# Patient Record
Sex: FEMALE | Race: WHITE
[De-identification: names, ages, dates, MRNs, and addresses within clinical notes are randomized per-mention and may not be internally consistent; named-entity substitution may affect disease eponyms.]

---

## 2023-08-22 ENCOUNTER — HOSPITAL ENCOUNTER (EMERGENCY)
Dept: HOSPITAL 97 - ER | Age: 78
Discharge: HOME | End: 2023-08-22
Payer: COMMERCIAL

## 2023-08-22 VITALS — TEMPERATURE: 97.4 F

## 2023-08-22 VITALS — SYSTOLIC BLOOD PRESSURE: 126 MMHG | DIASTOLIC BLOOD PRESSURE: 73 MMHG

## 2023-08-22 VITALS — OXYGEN SATURATION: 97 %

## 2023-08-22 DIAGNOSIS — E03.9: ICD-10-CM

## 2023-08-22 DIAGNOSIS — E78.00: ICD-10-CM

## 2023-08-22 DIAGNOSIS — N28.1: ICD-10-CM

## 2023-08-22 DIAGNOSIS — Z79.82: ICD-10-CM

## 2023-08-22 DIAGNOSIS — S30.0XXA: Primary | ICD-10-CM

## 2023-08-22 DIAGNOSIS — Z88.5: ICD-10-CM

## 2023-08-22 DIAGNOSIS — I10: ICD-10-CM

## 2023-08-22 LAB
ALBUMIN SERPL BCP-MCNC: 3.4 G/DL (ref 3.4–5)
ALP SERPL-CCNC: 101 U/L (ref 45–117)
ALT SERPL W P-5'-P-CCNC: 29 U/L (ref 13–56)
AST SERPL W P-5'-P-CCNC: 17 U/L (ref 15–37)
BUN BLD-MCNC: 20 MG/DL (ref 7–18)
GLUCOSE SERPLBLD-MCNC: 145 MG/DL (ref 74–106)
HCT VFR BLD CALC: 43.9 % (ref 36–45)
LIPASE SERPL-CCNC: 38 U/L (ref 13–75)
LYMPHOCYTES # SPEC AUTO: 3.5 K/UL (ref 0.7–4.9)
MCV RBC: 88.9 FL (ref 80–100)
PMV BLD: 8.1 FL (ref 7.6–11.3)
POTASSIUM SERPL-SCNC: 3.8 MEQ/L (ref 3.5–5.1)
RBC # BLD: 4.93 M/UL (ref 3.86–4.86)
WBC # BLD AUTO: 10.9 THOU/UL (ref 4.3–10.9)

## 2023-08-22 PROCEDURE — 74177 CT ABD & PELVIS W/CONTRAST: CPT

## 2023-08-22 PROCEDURE — 85025 COMPLETE CBC W/AUTO DIFF WBC: CPT

## 2023-08-22 PROCEDURE — 96374 THER/PROPH/DIAG INJ IV PUSH: CPT

## 2023-08-22 PROCEDURE — 99284 EMERGENCY DEPT VISIT MOD MDM: CPT

## 2023-08-22 PROCEDURE — 36415 COLL VENOUS BLD VENIPUNCTURE: CPT

## 2023-08-22 PROCEDURE — 83690 ASSAY OF LIPASE: CPT

## 2023-08-22 PROCEDURE — 96361 HYDRATE IV INFUSION ADD-ON: CPT

## 2023-08-22 PROCEDURE — 80053 COMPREHEN METABOLIC PANEL: CPT

## 2023-08-22 PROCEDURE — 96375 TX/PRO/DX INJ NEW DRUG ADDON: CPT

## 2023-08-22 PROCEDURE — 81001 URINALYSIS AUTO W/SCOPE: CPT

## 2023-08-22 NOTE — EDPHYS
Physician Documentation                                                                           

 [facility]                                                                 

Name: Kristine Valencia                                                                             

Age: 77 yrs                                                                                       

Sex: Female                                                                                       

: 1945                                                                                   

MRN: [id number]                                                                                   

Arrival Date: 2023                                                                          

Time: 11:34                                                                                       

Account#: [id number]                                                                            

Bed 19                                                                                            

Private MD:                                                                                       

[facility] Physician [place]                                                                      

HPI:                                                                                              

                                                                                             

12:35 This 77 yrs old Female presents to ER via Wheelchair with complaints of Flank Pain.     sb4 

12:35 The patient complains of pain in the right low back. The pain radiates to the right     sb4 

      lower quadrant. Onset: The symptoms/episode began/occurred 3 day(s) ago. Modifying          

      factors: The symptoms are alleviated by nothing. the symptoms are aggravated by             

      movement, palpation/percussion. Associated signs and symptoms: Pertinent positives:         

      nausea, Pertinent negatives: diarrhea, dizziness, dysuria, fever, urinary frequency,        

      headache, hematuria, pain radiating to the lower extremities, vomiting. The patient has     

      not experienced similar symptoms in the past. The patient has not recently seen a           

      physician.                                                                                  

                                                                                                  

Historical:                                                                                       

- Allergies:                                                                                      

11:49 Valium (agitation);                                                                     aa5 

- Home Meds:                                                                                      

11:49 levothyroxine 75mg once a day, Losartan 50mg daily, metoprolol 25mg daily, omeprazole   aa5 

      40mg daily, farxiga 10mg daily, atorvastatin 10mg daily, cyclobenzaprine 10mg daily,        

      aspirin 81mg daily [Active];                                                                

- PMHx:                                                                                           

11:49 Hypertensive disorder; Hypothyroidism; Hypercholesterolemia;                            aa5 

- PSHx:                                                                                           

11:49 Cholecystectomy;                                                                        aa5 

                                                                                                  

- Immunization history:: Adult Immunizations unknown.                                             

- Social history:: Smoking status: Patient denies any tobacco usage or history of.                

                                                                                                  

                                                                                                  

ROS:                                                                                              

12:50 Constitutional: Negative for fever, chills, and weight loss.                            sb4 

12:50 Abdomen/GI: Positive for nausea.                                                            

12:50 Abdomen/GI: Positive for abdominal pain.                                                    

12:50 Back: Positive for flank pain, on the right.                                                

12:50 All other systems are negative.                                                             

                                                                                                  

Exam:                                                                                             

12:50 Constitutional:  This is a well developed, well nourished patient who is awake, alert,  sb4 

      and in no acute distress. Head/Face:  Normocephalic, atraumatic. Eyes:  Extra-ocular        

      motions intact.  Periorbital areas with no swelling, redness, or edema. ENT:  Mucous        

      membranes moist. Cardiovascular:  Regular rate and rhythm with a normal S1 and S2.          

      Respiratory:  Lungs have equal breath sounds bilaterally, clear to auscultation and         

      percussion.  No rales, rhonchi or wheezes noted.  No increased work of breathing, no        

      retractions or nasal flaring. Skin:  Warm, dry with normal turgor.  Normal color with       

      no rashes, no lesions, and no evidence of cellulitis. MS/ Extremity:  Pulses equal, no      

      cyanosis.  Neurovascular intact.  Full, normal range of motion. Neuro:  Awake and           

      alert, GCS 15, oriented to person, place, time, and situation.  Cranial nerves II-XII       

      grossly intact.  Motor strength 5/5 in all extremities.  Sensory grossly intact.            

      Cerebellar exam normal.  Normal gait.                                                       

12:50 Abdomen/GI: Palpation: moderate abdominal tenderness, in the right lower quadrant,          

      involuntary guarding, is elicited in the right lower quadrant.                              

12:50 Back: CVA tenderness, that is moderate, is noted on the right.                              

                                                                                                  

Vital Signs:                                                                                      

11:44  / 82; Pulse 87; Resp 18 S; Temp 97.4(TE); Pulse Ox 93% on R/A; Weight 106.59 kg  aa5 

      (R); Height 5 ft. 6 in. (R);                                                                

12:10  / 85; Pulse 77; Resp 18; Pulse Ox 92% on R/A; Pain 8/10;                         nj1 

13:00  / 83; Pulse 83; Resp 18; Pulse Ox 93% on R/A; Pain 4/10;                         nj1 

14:05  / 73; Pulse 79; Resp 17; Pulse Ox 97% ; Pain 7/10;                               nj1 

15:01  / 73; Pulse 75; Resp 18; Pulse Ox 97% ;                                          nj1 

11:44 Body Mass Index 37.93 (106.59 kg, 167.64 cm)                                            aa5 

12:10 Pain Scale: Adult                                                                       nj1 

13:00 Pain Scale: Adult                                                                       nj1 

14:05 Pain Scale: Adult                                                                       nj1 

                                                                                                  

MDM:                                                                                              

11:37 Patient medically screened.                                                             sb4 

12:50 Differential diagnosis: nephrolithiasis, pyelonephritis, UTI, appendicitis, back strain.sb4 

14:31 Data reviewed: vital signs, nurses notes, lab test result(s), radiologic studies, and   sb4 

      as a result, I will discharge patient. Independent interpretation of the following          

      test(s) in the Emergency Department CT Scan: My interpretation is my interpretation of      

      the CT abdomen pelvis images are no nephrolithiasis or ureterolithiasis. Historians         

      other than the Patient: Daughter/Son: daughter. Care significantly affected by the          

      following chronic conditions: Hypertension, Chronic Kidney Disease. Counseling: I had a     

      detailed discussion with the patient and/or guardian regarding the historical points,       

      exam findings, and any diagnostic results supporting the discharge/admit diagnosis, lab     

      results, radiology results, to return to the emergency department if symptoms worsen or     

      persist or if there are any questions or concerns that arise at home. Special               

      discussion: Based on the patient's Hx, exam, and Dx evaluation, there is no indication      

      for emergent surgery or inpatient Tx. It is understood by the patient/guardian that if      

      the Sx's persist or worsen they need to return immediately for re-evaluation.               

                                                                                                  

                                                                                             

11:51 Order name: CBC with Diff; Complete Time: 12:26                                         sb4 

                                                                                             

11:51 Order name: CMP; Complete Time: 12:42                                                   sb4 

                                                                                             

11:51 Order name: Lipase; Complete Time: 12:42                                                sb4 

                                                                                             

11:51 Order name: Urinalysis w/ reflexes; Complete Time: 12:21                                sb4 

                                                                                             

11:51 Order name: CT Abd/Pelvis - IV Contrast Only; Complete Time: 13:32                      sb4 

                                                                                             

11:51 Order name: IV Saline Lock; Complete Time: 12:27                                        sb4 

                                                                                             

11:51 Order name: Labs collected and sent; Complete Time: 12:27                               sb4 

                                                                                                  

Administered Medications:                                                                         

12:15 Drug: Ondansetron IVP 4 mg Route: IVP; Site: right antecubital;                         nj1 

13:00 Follow up: Response: No adverse reaction                                                nj1 

12:15 Drug: morphine IVP or IV 4 mg Route: IVP; Infused Over: 4 mins; Site: right antecubital;nj1 

13:00 Follow up: Response: No adverse reaction; Pain is decreased                             nj1 

13:52 Drug: NS 0.9% IV 1000 ml Route: IV; Rate: 1 bolus; Site: right antecubital;             nj1 

15:10 Follow up: Response: No adverse reaction; IV Status: Completed infusion; IV Intake:     nj1 

      1000ml                                                                                      

14:05 Drug: Ketorolac IVP 15 mg Route: IVP; Site: right antecubital;                          nj1 

15:10 Follow up: Response: No adverse reaction                                                nj1 

                                                                                                  

                                                                                                  

Disposition Summary:                                                                              

23 14:45                                                                                    

Discharge Ordered                                                                                 

      Location: Home                                                                          sb4 

      Problem: new                                                                            sb4 

      Symptoms: have improved                                                                 sb4 

      Condition: Stable                                                                       sb4 

      Diagnosis                                                                                   

        - Contusion of lower back and pelvis                                                  sb4 

        - Cyst of kidney, acquired                                                            sb4 

      Followup:                                                                               sb4 

        - With:                                                                                    

        - When: As needed                                                                          

        - Reason: Recheck today's complaints, Continuance of care, Re-evaluation by your           

      physician                                                                                   

      Discharge Instructions:                                                                     

        - Discharge Summary Sheet                                                             sb4 

        - Contusion, Easy-to-Read                                                             sb4 

        - Low Back Sprain or Strain Rehab                                                     sb4 

      Forms:                                                                                      

        - Medication Reconciliation Form                                                      sb4 

        - Thank You Letter                                                                    sb4 

        - Antibiotic Education                                                                sb4 

        - Prescription Opioid Use                                                             sb4 

        - Patient Portal Instructions                                                         sb4 

        - Leadership Thank You Letter                                                         sb4 

      Prescriptions:                                                                              

        - acetaminophen-codeine 300-15 mg Oral tablet                                              

            - take 1 tablet by ORAL route 4 times per day As needed; 12 tablet; Refills: 0,   sb4 

      Product Selection Permitted                                                                 

Signatures:                                                                                       

Dispatcher [employer]                           EDMS                                                 

Cindy Muse, RN                     RN   aa5                                                  

Katja Hammond, PA-C                     PA-C sb4                                                  

Fadia Hills, RN                         RN   nj1                                                  

                                                                                                  

Corrections: (The following items were deleted from the chart)                                    

11:51 11:46 Allergies: "a tranquilizer the dentist gave me"- agitation; aa5                   aa5 

12:37 12:35 Onset: The symptoms/episode began/occurred 2 day(s) ago, sb4                      sb4 

12:37 12:35 Associated signs and symptoms: Pertinent positives: nausea, Pertinent negatives:  sb4 

      diarrhea, dizziness, dysuria, fever, urinary frequency, headache, hematuria, pain           

      radiating to the lower extremities, vomiting, sb4                                           

                                                                                                  

**************************************************************************************************

## 2023-08-22 NOTE — RAD REPORT
EXAM DESCRIPTION:  CTAbdomen   Pelvis W Contrast - 8/22/2023 12:55 pm

 

CLINICAL HISTORY:  Abdominal pain.

Abd pain;Flank pain

 

COMPARISON:  No comparisons

 

TECHNIQUE:  Biphasic CT imaging of the abdomen and pelvis was performed with 100 ml non-ionic IV cont
rast.

 

All CT scans are performed using dose optimization technique as appropriate and may include automated
 exposure control or mA/KV adjustment according to patient size.

 

FINDINGS:  The lung bases are clear.Cholecystectomy clips.

 

The liver, spleen, pancreas, adrenal glands and kidneys are within normal limits. Bilateral renal cys
ts, largest on the left measuring 4.5 cm.

 

No bowel obstruction, free air, free fluid or abscess. Moderate stool is present throughout the colon
. Sigmoid diverticulosis coli without diverticulitis. Normal appendix.  No evidence of significant ly
mphadenopathy.

 

Moderate lumbar degenerative changes.

 

IMPRESSION:  No acute intra-abdominal or pelvic finding.

## 2023-08-22 NOTE — ER
Nurse's Notes                                                                                     

 [facility]                                                                 

Name: Kristine Valencia                                                                             

Age: 77 yrs                                                                                       

Sex: Female                                                                                       

: 1945                                                                                   

MRN: [id number]                                                                                   

Arrival Date: 2023                                                                          

Time: 11:34                                                                                       

Account#: [id number]                                                                            

Bed 19                                                                                            

Private MD:                                                                                       

Diagnosis: Contusion of lower back and pelvis;Cyst of kidney, acquired                            

                                                                                                  

Presentation:                                                                                     

                                                                                             

11:44 Chief complaint: Patient states: Right flank pain radiating to right lower back that    aa5 

      began Saturday. Pt also reports nausea. Pt denies urinary symptoms. Coronavirus screen:     

      nausea. Ebola Screen: Patient denies travel to an Ebola-affected area in the 21 days        

      before illness onset. Onset of symptoms was 2023.                                    

11:44 Acuity: YAZMIN 3                                                                           aa5 

11:44 Method Of Arrival: Wheelchair                                                           aa5 

11:44 Initial Sepsis Screen: Does the patient meet any 2 criteria? No. Patient's initial      aa5 

      sepsis screen is negative. Does the patient have a suspected source of infection? No.       

      Patient's initial sepsis screen is negative. Risk Assessment: Do you want to hurt           

      yourself or someone else? Patient reports no desire to harm self or others.                 

                                                                                                  

Historical:                                                                                       

- Allergies:                                                                                      

11:49 Valium (agitation);                                                                     aa5 

- Home Meds:                                                                                      

11:49 levothyroxine 75mg once a day, Losartan 50mg daily, metoprolol 25mg daily, omeprazole   aa5 

      40mg daily, farxiga 10mg daily, atorvastatin 10mg daily, cyclobenzaprine 10mg daily,        

      aspirin 81mg daily [Active];                                                                

- PMHx:                                                                                           

11:49 Hypertensive disorder; Hypothyroidism; Hypercholesterolemia;                            aa5 

- PSHx:                                                                                           

11:49 Cholecystectomy;                                                                        aa5 

                                                                                                  

- Immunization history:: Adult Immunizations unknown.                                             

- Social history:: Smoking status: Patient denies any tobacco usage or history of.                

                                                                                                  

                                                                                                  

Screenin:25 [facility] ED Fall Risk Assessment (Adult) Score/Fall Risk Level 0 - 2 = Low Risk         nj1 

      Oriented to surroundings, Maintained a safe environment, Hourly rounding (assess needs      

      \T\ fall precautionary measures) done. Abuse screen: Denies threats or abuse. Denies        

      injuries from another. Nutritional screening: No deficits noted. Tuberculosis               

      screening: No symptoms or risk factors identified.                                          

                                                                                                  

Assessment:                                                                                       

12:10 General: Appears in no apparent distress. uncomfortable, Behavior is calm, cooperative, nj1 

      appropriate for age. Pain: Complains of pain in Flank, right Pain currently is 8 out of     

      10 on a pain scale.                                                                         

12:10 Neuro: Level of Consciousness is awake, alert, obeys commands, Oriented to person,      nj1 

      place, time, situation. Cardiovascular: Patient's skin is warm and dry. Respiratory:        

      Airway is patent Respiratory effort is even, unlabored.                                     

13:00 Reassessment: Patient appears in no apparent distress at this time. Patient and/or      nj1 

      family updated on plan of care and expected duration. Pain level reassessed. Patient is     

      alert, oriented x 3, equal unlabored respirations, skin warm/dry/pink.                      

14:05 Reassessment: Patient appears in no apparent distress at this time. Patient and/or      nj1 

      family updated on plan of care and expected duration. Pain level reassessed. Patient is     

      alert, oriented x 3, equal unlabored respirations, skin warm/dry/pink.                      

                                                                                                  

Vital Signs:                                                                                      

11:44  / 82; Pulse 87; Resp 18 S; Temp 97.4(TE); Pulse Ox 93% on R/A; Weight 106.59 kg  aa5 

      (R); Height 5 ft. 6 in. (R);                                                                

12:10  / 85; Pulse 77; Resp 18; Pulse Ox 92% on R/A; Pain 8/10;                         nj1 

13:00  / 83; Pulse 83; Resp 18; Pulse Ox 93% on R/A; Pain 4/10;                         nj1 

14:05  / 73; Pulse 79; Resp 17; Pulse Ox 97% ; Pain 7/10;                               nj1 

15:01  / 73; Pulse 75; Resp 18; Pulse Ox 97% ;                                          nj1 

11:44 Body Mass Index 37.93 (106.59 kg, 167.64 cm)                                            aa5 

12:10 Pain Scale: Adult                                                                       nj1 

13:00 Pain Scale: Adult                                                                       nj1 

14:05 Pain Scale: Adult                                                                       nj1 

                                                                                                  

ED Course:                                                                                        

11:36 Patient arrived in ED.                                                                  rg4 

11:36 Katja Hammond PA-C is PHCP.                                                            sb4 

11:37 Warner Rock MD is Attending Physician.                                             sb4 

11:44 Arm band placed on.                                                                     aa5 

11:46 Triage completed.                                                                       aa5 

11:53 Fadia Hills, RN is Primary Nurse.                                                       nj1 

12:15 Inserted saline lock: 20 gauge in right antecubital area, using aseptic technique.      nj1 

      Blood collected.                                                                            

12:26 Patient has correct armband on for positive identification. Bed in low position. Call   nj1 

      light in reach. Side rails up X 1. Adult w/ patient. Provided Education on: call light.     

12:57 CT Abd/Pelvis - IV Contrast Only In Process Unspecified.                                EDMS

14:45 Luis Riggs DO is Referral Physician.                                               sb4 

15:06 No provider procedures requiring assistance completed. IV discontinued, intact,         nj1 

      bleeding controlled.                                                                        

                                                                                                  

Administered Medications:                                                                         

12:15 Drug: Ondansetron IVP 4 mg Route: IVP; Site: right antecubital;                         nj1 

13:00 Follow up: Response: No adverse reaction                                                nj1 

12:15 Drug: morphine IVP or IV 4 mg Route: IVP; Infused Over: 4 mins; Site: right antecubital;nj1 

13:00 Follow up: Response: No adverse reaction; Pain is decreased                             nj1 

13:52 Drug: NS 0.9% IV 1000 ml Route: IV; Rate: 1 bolus; Site: right antecubital;             nj1 

15:10 Follow up: Response: No adverse reaction; IV Status: Completed infusion; IV Intake:     nj1 

      1000ml                                                                                      

14:05 Drug: Ketorolac IVP 15 mg Route: IVP; Site: right antecubital;                          nj1 

15:10 Follow up: Response: No adverse reaction                                                nj1 

                                                                                                  

                                                                                                  

Medication:                                                                                       

15:10 VIS not applicable for this client.                                                     nj1 

                                                                                                  

Intake:                                                                                           

15:10 IV: 1000ml; Total: 1000ml.                                                              nj1 

                                                                                                  

Outcome:                                                                                          

14:45 Discharge ordered by MD.                                                                sb4 

15:06 Discharged to home via wheelchair, with family.                                         nj1 

15:06 Condition: stable                                                                           

15:06 Discharge instructions given to patient, family, Instructed on discharge instructions,      

      follow up and referral plans. medication usage, Demonstrated understanding of               

      instructions, follow-up care, medications, Prescriptions given X 1.                         

15:11 Patient left the ED.                                                                    nj1 

                                                                                                  

Signatures:                                                                                       

Dispatcher [employer]                           EDMS                                                 

Cindy Muse, RN                     RN   Winifred [email or web address]                                                  

Katja Hammond, PA-C                     PARodrickC [email or web address]                                                  

Fadia Hills RN                         RN   nj1                                                  

                                                                                                  

Corrections: (The following items were deleted from the chart)                                    

11:51 11:44 Pulse 87bpm; Resp 18bpm; Spontaneous; Pulse Ox 93% RA; Temp 97.4F Temporal; aa5   aa5 

11:51 11:46 Allergies: "a tranquilizer the dentist gave me"- agitation; aa5                   aa5 

11:52 11:44 Pulse 87bpm; Resp 18bpm; Spontaneous; Pulse Ox 93% RA; Temp 97.4F Temporal;       aa5 

      106.59 kg Reported; Height 5 ft. 6 in. Reported; BMI: 37.9; aa5                             

                                                                                                  

**************************************************************************************************

## 2023-08-28 ENCOUNTER — HOSPITAL ENCOUNTER (EMERGENCY)
Dept: HOSPITAL 97 - ER | Age: 78
Discharge: HOME | End: 2023-08-28
Payer: COMMERCIAL

## 2023-08-28 VITALS — OXYGEN SATURATION: 96 % | SYSTOLIC BLOOD PRESSURE: 124 MMHG | DIASTOLIC BLOOD PRESSURE: 66 MMHG

## 2023-08-28 DIAGNOSIS — E78.00: ICD-10-CM

## 2023-08-28 DIAGNOSIS — Z88.5: ICD-10-CM

## 2023-08-28 DIAGNOSIS — I10: ICD-10-CM

## 2023-08-28 DIAGNOSIS — R10.31: Primary | ICD-10-CM

## 2023-08-28 DIAGNOSIS — E03.9: ICD-10-CM

## 2023-08-28 LAB
ALBUMIN SERPL BCP-MCNC: 3.7 G/DL (ref 3.4–5)
ALP SERPL-CCNC: 105 U/L (ref 45–117)
ALT SERPL W P-5'-P-CCNC: 30 U/L (ref 13–56)
AST SERPL W P-5'-P-CCNC: 19 U/L (ref 15–37)
BUN BLD-MCNC: 14 MG/DL (ref 7–18)
GLUCOSE SERPLBLD-MCNC: 116 MG/DL (ref 74–106)
HCT VFR BLD CALC: 45.2 % (ref 36–45)
LIPASE SERPL-CCNC: 45 U/L (ref 13–75)
LYMPHOCYTES # SPEC AUTO: 5.1 K/UL (ref 0.7–4.9)
MCV RBC: 89.9 FL (ref 80–100)
PMV BLD: 8.2 FL (ref 7.6–11.3)
POTASSIUM SERPL-SCNC: 3.4 MEQ/L (ref 3.5–5.1)
RBC # BLD: 5.03 M/UL (ref 3.86–4.86)
WBC # BLD AUTO: 13.8 THOU/UL (ref 4.3–10.9)

## 2023-08-28 PROCEDURE — 96375 TX/PRO/DX INJ NEW DRUG ADDON: CPT

## 2023-08-28 PROCEDURE — 76377 3D RENDER W/INTRP POSTPROCES: CPT

## 2023-08-28 PROCEDURE — 87088 URINE BACTERIA CULTURE: CPT

## 2023-08-28 PROCEDURE — 87086 URINE CULTURE/COLONY COUNT: CPT

## 2023-08-28 PROCEDURE — 99284 EMERGENCY DEPT VISIT MOD MDM: CPT

## 2023-08-28 PROCEDURE — 74176 CT ABD & PELVIS W/O CONTRAST: CPT

## 2023-08-28 PROCEDURE — 80053 COMPREHEN METABOLIC PANEL: CPT

## 2023-08-28 PROCEDURE — 96374 THER/PROPH/DIAG INJ IV PUSH: CPT

## 2023-08-28 PROCEDURE — 81001 URINALYSIS AUTO W/SCOPE: CPT

## 2023-08-28 PROCEDURE — 83690 ASSAY OF LIPASE: CPT

## 2023-08-28 PROCEDURE — 36415 COLL VENOUS BLD VENIPUNCTURE: CPT

## 2023-08-28 PROCEDURE — 85025 COMPLETE CBC W/AUTO DIFF WBC: CPT

## 2023-08-28 NOTE — ER
Nurse's Notes                                                                                     

 [facility]                                                                 

Name: Kristine Valencia                                                                             

Age: 77 yrs                                                                                       

Sex: Female                                                                                       

: 1945                                                                                   

MRN: [id number]                                                                                   

Arrival Date: 2023                                                                          

Time: 15:21                                                                                       

Account#: [id number]                                                                            

Bed 9                                                                                             

Private MD:                                                                                       

Diagnosis: Lower abdominal pain, unspecified                                                      

                                                                                                  

Presentation:                                                                                     

                                                                                             

16:17 Chief complaint: Patient states: right sided abd pain X 3 weeks , was seen here         iw  

      recently had a ct done. Coronavirus screen: At this time, the client does not indicate      

      any symptoms associated with coronavirus-19. Ebola Screen: Patient negative for fever       

      greater than or equal to 101.5 degrees Fahrenheit, and additional compatible Ebola          

      Virus Disease symptoms Patient denies exposure to infectious person. Patient denies         

      travel to an Ebola-affected area in the 21 days before illness onset. No symptoms or        

      risks identified at this time. Onset of symptoms was 2023.                       

16:17 Method Of Arrival: Wheelchair                                                           iw  

16:17 Acuity: YAZMIN 3                                                                           iw  

16:18 Initial Sepsis Screen: Does the patient meet any 2 criteria? No. Patient's initial      iw  

      sepsis screen is negative. Does the patient have a suspected source of infection? No.       

      Patient's initial sepsis screen is negative. Risk Assessment: Do you want to hurt           

      yourself or someone else? Patient reports no desire to harm self or others.                 

                                                                                                  

Historical:                                                                                       

- Allergies:                                                                                      

16:17 Valium (agitation);                                                                     iw  

- Home Meds:                                                                                      

20:46 levothyroxine 75mg once a day, Losartan 50mg daily, metoprolol 25mg daily, omeprazole   me1 

      40mg daily, farxiga 10mg daily, atorvastatin 10mg daily, cyclobenzaprine 10mg daily,        

      aspirin 81mg daily [Active];                                                                

- PMHx:                                                                                           

16:17 Hypercholesterolemia; Hypertensive disorder; Hypothyroidism;                            iw  

- PSHx:                                                                                           

16:17 Cholecystectomy;                                                                        iw  

                                                                                                  

- Immunization history:: Adult Immunizations up to date.                                          

- Social history:: Smoking status: Patient/guardian denies using tobacco, but has a               

  distant history of tobacco abuse.                                                               

                                                                                                  

                                                                                                  

Screenin:57 [facility] ED Fall Risk Assessment (Adult) History of falling in the last 3 months,       me1 

      including since admission No falls in past 3 months (0 pts) Confusion or Disorientation     

      No (0 pts) Intoxicated or Sedated No (0 pts) Impaired Gait No (0 pts) Mobility Assist       

      Device Used No (0 pt) Altered Elimination No (0 pt) Score/Fall Risk Level 0 - 2 = Low       

      Risk. Abuse screen: Denies threats or abuse. Nutritional screening: No deficits noted.      

      Tuberculosis screening: No symptoms or risk factors identified.                             

                                                                                                  

Assessment:                                                                                       

19:57 General: Appears uncomfortable, well groomed, well developed, well nourished, Behavior  me1 

      is calm, cooperative, appropriate for age, Reports RLQ abdominal pain that radiates to      

      right flank x 3 weeks. Denies fever, feeling ill, fatigue, chills. Pain: Complains of       

      pain in abdomen Pain radiates to back Pain currently is 8 out of 10 on a pain scale.        

      Quality of pain is described as shooting, Pain began 3 weeks ago Is continuous. Neuro:      

      Level of Consciousness is awake, alert, obeys commands, Oriented to person, place,          

      time, situation, Appropriate for age. Cardiovascular: Capillary refill < 3 seconds          

      Patient's skin is warm and dry. Respiratory: Airway is patent Respiratory effort is         

      even, unlabored, Respiratory pattern is regular, symmetrical. GI: Bowel sounds present      

      X 4 quads. Reports lower abdominal pain. : Denies burning with urination.                 

                                                                                                  

Vital Signs:                                                                                      

19:57  / 68; Pulse 86; Resp 18; Pulse Ox 94% on R/A;                                    me1 

20:44  / 66; Pulse 93; Resp 17; Pulse Ox 96% on R/A;                                    me1 

                                                                                                  

ED Course:                                                                                        

15:21 Patient placed in waiting room.                                                         me1 

15:24 Patient arrived in ED.                                                                  mg5 

15:59 [facility]                                                        kb  

15:59 Ap Rush MD is Attending Physician.                                                kb  

16:17 Triage completed.                                                                       iw  

17:19 CBC with Diff Sent.                                                                     bc6 

17:19 CMP Sent.                                                                               bc6 

17:20 Lipase Sent.                                                                            bc6 

17:20 Urinalysis w/ reflexes Sent.                                                            bc6 

17:20 Inserted saline lock: 22 gauge in right antecubital area, using aseptic technique.      bc6 

      Blood collected.                                                                            

19:20 Meagan Tejada, RN is Primary Nurse.                                                me1 

19:28 CT Stone Protocol In Process Unspecified.                                               EDMS

19:57 No provider procedures requiring assistance completed. Flushed right antecubital.       me1 

19:57 Patient has correct armband on for positive identification. Bed in low position. Call   me1 

      light in reach. Side rails up X2. Provided Education on: POC. Verbalized understanding.     

      .                                                                                           

20:44 IV discontinued, intact, bleeding controlled, No redness/swelling at site. Pressure     me1 

      dressing applied.                                                                           

                                                                                                  

Administered Medications:                                                                         

19:56 Drug: Ondansetron IVP 4 mg Route: IVP; Site: right antecubital;                         me1 

20:30 Follow up: Response: No adverse reaction; Nausea is decreased                           me1 

19:56 Drug: morphine IVP or IV 4 mg Route: IVP; Infused Over: 4 mins; Site: right antecubital;me1 

20:30 Follow up: Response: No adverse reaction; Pain is decreased                             me1 

                                                                                                  

                                                                                                  

Medication:                                                                                       

19:57 VIS not applicable for this client.                                                     me1 

                                                                                                  

Outcome:                                                                                          

20:23 Discharge ordered by MD.                                                                lópez  

20:45 Discharged to home ambulatory.                                                          me1 

20:45 Condition: stable                                                                           

20:45 Discharge instructions given to patient, family, Instructed on discharge instructions,      

      follow up and referral plans. Demonstrated understanding of instructions, follow-up         

      care.                                                                                       

20:46 Patient left the ED.                                                                    me1 

                                                                                                  

Signatures:                                                                                       

Dispatcher [employer]                           EDTalia Saldana, FNP-C                 FNP-Ckb                                                   

Ana Vargas, RN                     RN   iw                                                   

Lexii Shine                           bc6                                                  

Meagan Tejada, RN                  RN   me1                                                  

Arleth Wisdom                             mg5                                                  

                                                                                                  

Corrections: (The following items were deleted from the chart)                                    

16:19 16:17 Chief complaint: Patient states: right sided abd pain iw                          iw  

                                                                                                  

**************************************************************************************************

## 2023-08-28 NOTE — EDPHYS
Physician Documentation                                                                           

 [facility]                                                                 

Name: Kristine Valencia                                                                             

Age: 77 yrs                                                                                       

Sex: Female                                                                                       

: 1945                                                                                   

MRN: [id number]                                                                                   

Arrival Date: 2023                                                                          

Time: 15:21                                                                                       

Account#: [id number]                                                                            

Bed 9                                                                                             

Private MD:                                                                                       

ED Physician Ap Rush                                                                         

HPI:                                                                                              

                                                                                             

18:06 This 77 yrs old Female presents to ER via Wheelchair with complaints of Abdominal Pain  kb  

      - Low Right Side.                                                                           

18:06 The patient presents with abdominal pain right lower quadrant. Onset: The               kb  

      symptoms/episode began/occurred 3 week(s) ago. The symptoms radiate to right back.          

      Associated signs and symptoms: none. The symptoms are described as constant. Modifying      

      factors: The symptoms are alleviated by nothing, the symptoms are aggravated by             

      nothing. Severity of pain: At its worst the pain was moderate in the emergency              

      department the pain is unchanged. The patient has not experienced similar symptoms in       

      the past. The patient has been recently seen at the [facility]       

      Emergency Department, last week, for similar complaints labs were performed, CT scan        

      was performed.                                                                              

                                                                                                  

Historical:                                                                                       

- Allergies:                                                                                      

16:17 Valium (agitation);                                                                     iw  

- Home Meds:                                                                                      

20:46 levothyroxine 75mg once a day, Losartan 50mg daily, metoprolol 25mg daily, omeprazole   me1 

      40mg daily, farxiga 10mg daily, atorvastatin 10mg daily, cyclobenzaprine 10mg daily,        

      aspirin 81mg daily [Active];                                                                

- PMHx:                                                                                           

16:17 Hypercholesterolemia; Hypertensive disorder; Hypothyroidism;                            iw  

- PSHx:                                                                                           

16:17 Cholecystectomy;                                                                        iw  

                                                                                                  

- Immunization history:: Adult Immunizations up to date.                                          

- Social history:: Smoking status: Patient/guardian denies using tobacco, but has a               

  distant history of tobacco abuse.                                                               

                                                                                                  

                                                                                                  

ROS:                                                                                              

18:05 Constitutional: Negative for fever, chills, and weight loss.                            kb  

18:05 Abdomen/GI: Positive for abdominal pain, Negative for nausea, vomiting, and diarrhea.       

18:05 All other systems are negative.                                                             

                                                                                                  

Exam:                                                                                             

18:05 Constitutional:  This is a well developed, well nourished patient who is awake, alert,  kb  

      and in no acute distress. Head/Face:  Normocephalic, atraumatic. ENT:  Moist Mucous         

      membranes Cardiovascular:  Regular rate and rhythm with a normal S1 and S2.  No             

      gallops, murmurs, or rubs.  No pulse deficits. Respiratory:  Respirations even and          

      unlabored. No increased work of breathing. Talking in full sentences Skin:  Warm, dry       

      with normal turgor.  Normal color. MS/ Extremity:  Pulses equal, no cyanosis.               

      Neurovascular intact.  Full, normal range of motion. Neuro:  Awake and alert, GCS 15,       

      oriented to person, place, time, and situation. Moves all extremities. Normal gait.         

18:05 Abdomen/GI: Inspection: abdomen appears normal, Bowel sounds: normal, Palpation: soft,      

      in all quadrants, mild abdominal tenderness, in the right lower quadrant.                   

                                                                                                  

Vital Signs:                                                                                      

19:57  / 68; Pulse 86; Resp 18; Pulse Ox 94% on R/A;                                    me1 

20:44  / 66; Pulse 93; Resp 17; Pulse Ox 96% on R/A;                                    me1 

                                                                                                  

MDM:                                                                                              

16:01 Patient medically screened.                                                             kb  

18:05 Data reviewed: vital signs, nurses notes.                                               kb  

18:07 Differential diagnosis: appendicitis, non-specific abd pain, Ureterolithiasis, urinary  kb  

      tract infection. Historians other than the Patient: Daughter/Son: daughter.                 

20:23 Counseling: I had a detailed discussion with the patient and/or guardian regarding the  kb  

      historical points, exam findings, and any diagnostic results supporting the                 

      discharge/admit diagnosis, lab results, radiology results, the need for outpatient          

      follow up, a family practitioner, to return to the emergency department if symptoms         

      worsen or persist or if there are any questions or concerns that arise at home.             

                                                                                                  

                                                                                             

16:24 Order name: CBC with Diff; Complete Time: 18:03                                           

                                                                                             

16:24 Order name: CMP; Complete Time: 18:03                                                     

                                                                                             

16:24 Order name: Lipase; Complete Time: 18:03                                                iw  

                                                                                             

16:24 Order name: Urinalysis w/ reflexes; Complete Time: 17:50                                iw  

                                                                                             

17:53 Order name: Urine Culture                                                               EDMS

                                                                                             

18:03 Order name: CT Stone Protocol; Complete Time: 19:45                                     kb  

                                                                                             

16:24 Order name: IV Saline Lock; Complete Time: 17:19                                        iw  

                                                                                             

16:24 Order name: Labs collected and sent; Complete Time: 17:19                               iw  

                                                                                                  

Administered Medications:                                                                         

19:56 Drug: Ondansetron IVP 4 mg Route: IVP; Site: right antecubital;                         me1 

20:30 Follow up: Response: No adverse reaction; Nausea is decreased                           me1 

19:56 Drug: morphine IVP or IV 4 mg Route: IVP; Infused Over: 4 mins; Site: right antecubital;me1 

20:30 Follow up: Response: No adverse reaction; Pain is decreased                             me1 

                                                                                                  

                                                                                                  

Disposition Summary:                                                                              

23 20:23                                                                                    

Discharge Ordered                                                                                 

      Location: Home                                                                          kb  

      Condition: Stable                                                                       kb  

      Diagnosis                                                                                   

        - Lower abdominal pain, unspecified                                                   kb  

      Followup:                                                                               kb  

        - With: Emergency Department                                                               

        - When: As needed                                                                          

        - Reason: Worsening of condition                                                           

      Followup:                                                                               kb  

        - With: Private Physician                                                                  

        - When: 2 - 3 days                                                                         

        - Reason: Recheck today's complaints, Continuance of care, Re-evaluation by your           

      physician                                                                                   

      Discharge Instructions:                                                                     

        - Discharge Summary Sheet                                                             kb  

        - Abdominal Pain, Adult, Easy-to-Read                                                 kb  

        - Muscle Strain, Easy-to-Read                                                         kb  

      Forms:                                                                                      

        - Medication Reconciliation Form                                                      kb  

        - Thank You Letter                                                                    kb  

        - Antibiotic Education                                                                kb  

        - Prescription Opioid Use                                                             kb  

        - Patient Portal Instructions                                                         kb  

        - Leadership Thank You Letter                                                         kb  

Signatures:                                                                                       

Dispatcher [employer]                           EDTalia Saldana, PAKO-C                 FNP-[place]                                                   

Ana Vargas, RN                     RN   iw                                                   

Meagan Tejada, RN                  RN   me1                                                  

                                                                                                  

Corrections: (The following items were deleted from the chart)                                    

18:07 18:06 The patient has been recently seen at the [facility]       kb  

      Emergency Department, last week, kb                                                         

                                                                                                  

**************************************************************************************************

## 2023-08-28 NOTE — RAD REPORT
EXAM DESCRIPTION:  CT - Stone Protocol - 8/28/2023 7:27 pm

 

CLINICAL HISTORY:  Flank pain.

ABD PAIN

 

COMPARISON:  Abdomen   Pelvis W Contrast dated 8/22/2023

 

TECHNIQUE:  Axial images were obtained without oral or IV contrast. Lack of contrast limits solid org
an and vascular assessment. The field-of-view spans the entirety of the  system partially obscuring
 uppermost abdomen and lung bases. Coronal reformatted images were obtained and reviewed.

 

All CT scans are performed using dose optimization technique as appropriate and may include automated
 exposure control or mA/KV adjustment according to patient size.

 

FINDINGS:  The lower lung fields are clear. Cholecystectomy.

 

Imaged portions of the liver and spleen show no suspicious findings on non-contrast imaging. The panc
reas and adrenal glands are normal. No pathologic lymphadenopathy in the abdomen or pelvis.

 

No urinary tract stones or obstructive uropathy. Benign renal cysts bilaterally.

 

No bowel obstruction, free air, free fluid or abscess. Normal appendix noted.

 

Moderate multilevel lumbar degenerative changes.

 

IMPRESSION:  No urinary tract stones or obstructive uropathy.

 

Moderate lumbar degenerative changes.